# Patient Record
Sex: FEMALE | Race: ASIAN | NOT HISPANIC OR LATINO | URBAN - METROPOLITAN AREA
[De-identification: names, ages, dates, MRNs, and addresses within clinical notes are randomized per-mention and may not be internally consistent; named-entity substitution may affect disease eponyms.]

---

## 2020-02-27 ENCOUNTER — OUTPATIENT (OUTPATIENT)
Dept: OUTPATIENT SERVICES | Facility: HOSPITAL | Age: 55
LOS: 1 days | End: 2020-02-27
Payer: COMMERCIAL

## 2020-02-27 DIAGNOSIS — N84.0 POLYP OF CORPUS UTERI: ICD-10-CM

## 2020-02-28 VITALS
SYSTOLIC BLOOD PRESSURE: 136 MMHG | DIASTOLIC BLOOD PRESSURE: 82 MMHG | HEART RATE: 69 BPM | TEMPERATURE: 98 F | RESPIRATION RATE: 16 BRPM | HEIGHT: 60 IN | OXYGEN SATURATION: 98 % | WEIGHT: 141.98 LBS

## 2020-02-28 DIAGNOSIS — N84.0 POLYP OF CORPUS UTERI: ICD-10-CM

## 2020-02-28 DIAGNOSIS — E11.9 TYPE 2 DIABETES MELLITUS WITHOUT COMPLICATIONS: ICD-10-CM

## 2020-02-28 DIAGNOSIS — I10 ESSENTIAL (PRIMARY) HYPERTENSION: ICD-10-CM

## 2020-02-28 DIAGNOSIS — Z98.890 OTHER SPECIFIED POSTPROCEDURAL STATES: Chronic | ICD-10-CM

## 2020-02-28 LAB
ANION GAP SERPL CALC-SCNC: 13 MMO/L — SIGNIFICANT CHANGE UP (ref 7–14)
BUN SERPL-MCNC: 15 MG/DL — SIGNIFICANT CHANGE UP (ref 7–23)
CALCIUM SERPL-MCNC: 9.5 MG/DL — SIGNIFICANT CHANGE UP (ref 8.4–10.5)
CHLORIDE SERPL-SCNC: 98 MMOL/L — SIGNIFICANT CHANGE UP (ref 98–107)
CO2 SERPL-SCNC: 27 MMOL/L — SIGNIFICANT CHANGE UP (ref 22–31)
CREAT SERPL-MCNC: 0.78 MG/DL — SIGNIFICANT CHANGE UP (ref 0.5–1.3)
GLUCOSE SERPL-MCNC: 128 MG/DL — HIGH (ref 70–99)
HBA1C BLD-MCNC: 7.2 % — HIGH (ref 4–5.6)
HCT VFR BLD CALC: 45.4 % — HIGH (ref 34.5–45)
HGB BLD-MCNC: 14.9 G/DL — SIGNIFICANT CHANGE UP (ref 11.5–15.5)
MCHC RBC-ENTMCNC: 29.2 PG — SIGNIFICANT CHANGE UP (ref 27–34)
MCHC RBC-ENTMCNC: 32.8 % — SIGNIFICANT CHANGE UP (ref 32–36)
MCV RBC AUTO: 88.8 FL — SIGNIFICANT CHANGE UP (ref 80–100)
NRBC # FLD: 0 K/UL — SIGNIFICANT CHANGE UP (ref 0–0)
PLATELET # BLD AUTO: 247 K/UL — SIGNIFICANT CHANGE UP (ref 150–400)
PMV BLD: 10.3 FL — SIGNIFICANT CHANGE UP (ref 7–13)
POTASSIUM SERPL-MCNC: 3.5 MMOL/L — SIGNIFICANT CHANGE UP (ref 3.5–5.3)
POTASSIUM SERPL-SCNC: 3.5 MMOL/L — SIGNIFICANT CHANGE UP (ref 3.5–5.3)
RBC # BLD: 5.11 M/UL — SIGNIFICANT CHANGE UP (ref 3.8–5.2)
RBC # FLD: 12.3 % — SIGNIFICANT CHANGE UP (ref 10.3–14.5)
SODIUM SERPL-SCNC: 138 MMOL/L — SIGNIFICANT CHANGE UP (ref 135–145)
WBC # BLD: 5.69 K/UL — SIGNIFICANT CHANGE UP (ref 3.8–10.5)
WBC # FLD AUTO: 5.69 K/UL — SIGNIFICANT CHANGE UP (ref 3.8–10.5)

## 2020-02-28 PROCEDURE — 93010 ELECTROCARDIOGRAM REPORT: CPT

## 2020-02-28 NOTE — H&P PST ADULT - NEGATIVE GENERAL GENITOURINARY SYMPTOMS
no dysuria/no flank pain L/no bladder infections/no flank pain R/no urinary hesitancy/normal urinary frequency/no hematuria

## 2020-02-28 NOTE — H&P PST ADULT - NSICDXPROBLEM_GEN_ALL_CORE_FT
PROBLEM DIAGNOSES  Problem: Polyp of corpus uteri  Assessment and Plan: Scheduled for dilation curettage hysteroscopy, polypctomy with larissa on 3/11/2020. labs done and results pending.  Verbal and written preop instruction given and explained. Patient verbalized understanding. Famotidine provided with instruction. Patient verbalized understanding.   As per pt, medical evaluation requested by surgeon and will see PCP this PM for medical evaluation.     Problem: Hypertension  Assessment and Plan: Instructed to continue taking losartan as prescribed. EKG done at [facility]. intermediate risk for sleep apnea identified by STOP BANG survey. OR booking notified via fax.     Problem: Diabetes mellitus  Assessment and Plan: HbgA1C sent, pending result. Blood glucose - Accuchek ordered stat on admit. OR booking was notified pt's DM status via fax.   Instructed to hold metformin PM on 3/10/2020 & AM of surgery. Verbalized understanding.

## 2020-02-28 NOTE — H&P PST ADULT - RS GEN PE MLT RESP DETAILS PC
no rhonchi/clear to auscultation bilaterally/good air movement/respirations non-labored/no rales/no wheezes

## 2020-02-28 NOTE — H&P PST ADULT - HISTORY OF PRESENT ILLNESS
54 yo female with hx of HTN, T2DM, HLD presents to have PST evaluation with preop dx of polyp of corpus uteri. Patient reports, had initial gyn evaluation in 11/2019, had TVUS done,  which revealed, "thickening of lining & polyp in uterus", surgical intervention was recommended, now scheduled for dilation curettage hysteroscopy, polypectomy with larissa. 54 yo female with hx of HTN, T2DM, HLD presents to have PST evaluation with preop dx of polyp of corpus uteri. Patient reports, had annual gyn evaluation in 11/2019, had TVUS done,  which revealed, "thickening of lining & polyp in uterus", surgical intervention was recommended, now scheduled for dilation curettage hysteroscopy, polypectomy with larissa.

## 2020-02-28 NOTE — H&P PST ADULT - NEGATIVE ENMT SYMPTOMS
no sinus symptoms/no nasal obstruction/no post-nasal discharge/no ear pain/no tinnitus/no hearing difficulty/no vertigo/no nasal discharge/no dysphagia/no nasal congestion No

## 2020-02-28 NOTE — H&P PST ADULT - NEGATIVE NEUROLOGICAL SYMPTOMS
no syncope/no paresthesias/no weakness/no tremors/no headache/no focal seizures/no difficulty walking/no vertigo

## 2020-03-10 NOTE — ASU PATIENT PROFILE, ADULT - MENTAL HEALTH CONDITIONS/SYMPTOMS, PROFILE
Date of Service: 03/29/2018    Sabrina is a 65-year-old white female entering today for follow up on her hypertension, her broken neck and hyponatremia.  Bonnie is now complaining of a little redness in her right leg, which I think is a superficial phlebitis.    X-rays were taken of her right leg and her hip and they were normal.  I suggested to her that this may be a little localized area of superficial phlebitis, but if it does not resolve with conservative therapy she may need a bone scan.    She has had no fainting or falling, she states, although her  tells me that she has fallen and that she is drinking, although the patient will not admit it to me and I really do not want to hurt her feelings by confronting her with it, but apparently she is drinking a fair amount.    Her other medications were reviewed with her and are as listed.    PHYSICAL EXAMINATION:  VITAL SIGNS:  Blood pressure is today 160/100 in both arms and then standing dropped to 140/100.  HEART:  Tones are regular.  CHEST:  Clear with no wheezes, rhonchi or rales.  ABDOMEN:  Nontender with a little bit of liver enlargement.  EXTREMITIES:  No peripheral edema.      Suggested she reduce her Sodium Chloride to once a day and go back on her Catapres 0.1 and workup to 2-3 daily if necessary.  Her sodium and potassium were good today with a sodium of 133 and a normal potassium and creatinine, and for that reason, she will continue on the same medications and see me for her physical in May.  Let me know if the leg does not get better.      Dictated By: Zoran Casiano MD  Signing Provider: MD NATY Landa/eugenia ([id number])  DD: 03/29/2018 11:13:14 TD: 03/29/2018 20:53:25    Copy Sent To:    none

## 2020-03-19 PROBLEM — I10 ESSENTIAL (PRIMARY) HYPERTENSION: Chronic | Status: ACTIVE | Noted: 2020-02-28

## 2020-03-19 PROBLEM — E11.9 TYPE 2 DIABETES MELLITUS WITHOUT COMPLICATIONS: Chronic | Status: ACTIVE | Noted: 2020-02-28

## 2020-03-19 PROBLEM — E78.5 HYPERLIPIDEMIA, UNSPECIFIED: Chronic | Status: ACTIVE | Noted: 2020-02-28

## 2020-07-25 DIAGNOSIS — Z01.818 ENCOUNTER FOR OTHER PREPROCEDURAL EXAMINATION: ICD-10-CM

## 2020-07-28 ENCOUNTER — APPOINTMENT (OUTPATIENT)
Dept: DISASTER EMERGENCY | Facility: CLINIC | Age: 55
End: 2020-07-28

## 2020-07-29 ENCOUNTER — TRANSCRIPTION ENCOUNTER (OUTPATIENT)
Age: 55
End: 2020-07-29

## 2020-07-29 LAB — SARS-COV-2 N GENE NPH QL NAA+PROBE: NOT DETECTED

## 2020-07-29 RX ORDER — ATORVASTATIN CALCIUM 80 MG/1
1 TABLET, FILM COATED ORAL
Qty: 0 | Refills: 0 | DISCHARGE

## 2020-07-29 RX ORDER — LOSARTAN POTASSIUM 100 MG/1
1 TABLET, FILM COATED ORAL
Qty: 0 | Refills: 0 | DISCHARGE

## 2020-07-29 RX ORDER — SODIUM CHLORIDE 9 MG/ML
3 INJECTION INTRAMUSCULAR; INTRAVENOUS; SUBCUTANEOUS EVERY 8 HOURS
Refills: 0 | Status: DISCONTINUED | OUTPATIENT
Start: 2020-07-30 | End: 2020-08-14

## 2020-07-29 RX ORDER — LIDOCAINE HCL 20 MG/ML
0.2 VIAL (ML) INJECTION ONCE
Refills: 0 | Status: DISCONTINUED | OUTPATIENT
Start: 2020-07-30 | End: 2020-08-14

## 2020-07-29 RX ORDER — METFORMIN HYDROCHLORIDE 850 MG/1
2 TABLET ORAL
Qty: 0 | Refills: 0 | DISCHARGE

## 2020-07-30 ENCOUNTER — OUTPATIENT (OUTPATIENT)
Dept: OUTPATIENT SERVICES | Facility: HOSPITAL | Age: 55
LOS: 1 days | End: 2020-07-30
Payer: COMMERCIAL

## 2020-07-30 ENCOUNTER — RESULT REVIEW (OUTPATIENT)
Age: 55
End: 2020-07-30

## 2020-07-30 ENCOUNTER — EMERGENCY (EMERGENCY)
Facility: HOSPITAL | Age: 55
LOS: 1 days | End: 2020-07-30
Attending: EMERGENCY MEDICINE
Payer: COMMERCIAL

## 2020-07-30 VITALS
RESPIRATION RATE: 16 BRPM | OXYGEN SATURATION: 99 % | DIASTOLIC BLOOD PRESSURE: 72 MMHG | WEIGHT: 138.01 LBS | TEMPERATURE: 99 F | HEIGHT: 59.5 IN | HEART RATE: 76 BPM | SYSTOLIC BLOOD PRESSURE: 106 MMHG

## 2020-07-30 VITALS
TEMPERATURE: 98 F | OXYGEN SATURATION: 100 % | HEART RATE: 96 BPM | DIASTOLIC BLOOD PRESSURE: 82 MMHG | SYSTOLIC BLOOD PRESSURE: 146 MMHG | RESPIRATION RATE: 13 BRPM

## 2020-07-30 VITALS
TEMPERATURE: 98 F | RESPIRATION RATE: 20 BRPM | OXYGEN SATURATION: 98 % | DIASTOLIC BLOOD PRESSURE: 78 MMHG | HEIGHT: 59.5 IN | SYSTOLIC BLOOD PRESSURE: 158 MMHG | HEART RATE: 110 BPM

## 2020-07-30 DIAGNOSIS — Z98.890 OTHER SPECIFIED POSTPROCEDURAL STATES: Chronic | ICD-10-CM

## 2020-07-30 DIAGNOSIS — N84.0 POLYP OF CORPUS UTERI: ICD-10-CM

## 2020-07-30 LAB
A1C WITH ESTIMATED AVERAGE GLUCOSE RESULT: 7.2 % — HIGH (ref 4–5.6)
ALBUMIN SERPL ELPH-MCNC: 4.3 G/DL — SIGNIFICANT CHANGE UP (ref 3.3–5)
ALP SERPL-CCNC: 75 U/L — SIGNIFICANT CHANGE UP (ref 40–120)
ALT FLD-CCNC: 22 U/L — SIGNIFICANT CHANGE UP (ref 10–45)
ANION GAP SERPL CALC-SCNC: 15 MMOL/L — SIGNIFICANT CHANGE UP (ref 5–17)
ANION GAP SERPL CALC-SCNC: 20 MMOL/L — HIGH (ref 5–17)
APPEARANCE UR: CLEAR — SIGNIFICANT CHANGE UP
APTT BLD: 35.3 SEC — SIGNIFICANT CHANGE UP (ref 27.5–35.5)
AST SERPL-CCNC: 26 U/L — SIGNIFICANT CHANGE UP (ref 10–40)
BACTERIA # UR AUTO: NEGATIVE — SIGNIFICANT CHANGE UP
BASE EXCESS BLDV CALC-SCNC: 2 MMOL/L — SIGNIFICANT CHANGE UP (ref -2–2)
BASOPHILS # BLD AUTO: 0 K/UL — SIGNIFICANT CHANGE UP (ref 0–0.2)
BASOPHILS NFR BLD AUTO: 0 % — SIGNIFICANT CHANGE UP (ref 0–2)
BILIRUB SERPL-MCNC: 0.6 MG/DL — SIGNIFICANT CHANGE UP (ref 0.2–1.2)
BILIRUB UR-MCNC: NEGATIVE — SIGNIFICANT CHANGE UP
BUN SERPL-MCNC: 15 MG/DL — SIGNIFICANT CHANGE UP (ref 7–23)
BUN SERPL-MCNC: 16 MG/DL — SIGNIFICANT CHANGE UP (ref 7–23)
CA-I SERPL-SCNC: 1.1 MMOL/L — LOW (ref 1.12–1.3)
CALCIUM SERPL-MCNC: 9.2 MG/DL — SIGNIFICANT CHANGE UP (ref 8.4–10.5)
CALCIUM SERPL-MCNC: 9.8 MG/DL — SIGNIFICANT CHANGE UP (ref 8.4–10.5)
CHLORIDE BLDV-SCNC: 103 MMOL/L — SIGNIFICANT CHANGE UP (ref 96–108)
CHLORIDE SERPL-SCNC: 101 MMOL/L — SIGNIFICANT CHANGE UP (ref 96–108)
CHLORIDE SERPL-SCNC: 97 MMOL/L — SIGNIFICANT CHANGE UP (ref 96–108)
CO2 BLDV-SCNC: 29 MMOL/L — SIGNIFICANT CHANGE UP (ref 22–30)
CO2 SERPL-SCNC: 22 MMOL/L — SIGNIFICANT CHANGE UP (ref 22–31)
CO2 SERPL-SCNC: 25 MMOL/L — SIGNIFICANT CHANGE UP (ref 22–31)
COLOR SPEC: ABNORMAL
CREAT SERPL-MCNC: 0.76 MG/DL — SIGNIFICANT CHANGE UP (ref 0.5–1.3)
CREAT SERPL-MCNC: 0.77 MG/DL — SIGNIFICANT CHANGE UP (ref 0.5–1.3)
DIFF PNL FLD: ABNORMAL
EOSINOPHIL # BLD AUTO: 0.09 K/UL — SIGNIFICANT CHANGE UP (ref 0–0.5)
EOSINOPHIL NFR BLD AUTO: 0.9 % — SIGNIFICANT CHANGE UP (ref 0–6)
EPI CELLS # UR: 1 /HPF — SIGNIFICANT CHANGE UP
ESTIMATED AVERAGE GLUCOSE: 160 MG/DL — HIGH (ref 68–114)
GAS PNL BLDV: 138 MMOL/L — SIGNIFICANT CHANGE UP (ref 135–145)
GAS PNL BLDV: SIGNIFICANT CHANGE UP
GLUCOSE BLDV-MCNC: 157 MG/DL — HIGH (ref 70–99)
GLUCOSE SERPL-MCNC: 133 MG/DL — HIGH (ref 70–99)
GLUCOSE SERPL-MCNC: 155 MG/DL — HIGH (ref 70–99)
GLUCOSE UR QL: NEGATIVE — SIGNIFICANT CHANGE UP
HCO3 BLDV-SCNC: 28 MMOL/L — SIGNIFICANT CHANGE UP (ref 21–29)
HCT VFR BLD CALC: 44.4 % — SIGNIFICANT CHANGE UP (ref 34.5–45)
HCT VFR BLD CALC: 45.4 % — HIGH (ref 34.5–45)
HCT VFR BLDA CALC: 49 % — SIGNIFICANT CHANGE UP (ref 39–50)
HGB BLD CALC-MCNC: 16 G/DL — HIGH (ref 11.5–15.5)
HGB BLD-MCNC: 15.1 G/DL — SIGNIFICANT CHANGE UP (ref 11.5–15.5)
HGB BLD-MCNC: 15.3 G/DL — SIGNIFICANT CHANGE UP (ref 11.5–15.5)
HYALINE CASTS # UR AUTO: 3 /LPF — HIGH (ref 0–2)
INR BLD: 1.02 RATIO — SIGNIFICANT CHANGE UP (ref 0.88–1.16)
KETONES UR-MCNC: NEGATIVE — SIGNIFICANT CHANGE UP
LACTATE BLDV-MCNC: 2.1 MMOL/L — HIGH (ref 0.7–2)
LEUKOCYTE ESTERASE UR-ACNC: NEGATIVE — SIGNIFICANT CHANGE UP
LYMPHOCYTES # BLD AUTO: 0.27 K/UL — LOW (ref 1–3.3)
LYMPHOCYTES # BLD AUTO: 2.6 % — LOW (ref 13–44)
MAGNESIUM SERPL-MCNC: 2.4 MG/DL — SIGNIFICANT CHANGE UP (ref 1.6–2.6)
MANUAL SMEAR VERIFICATION: SIGNIFICANT CHANGE UP
MCHC RBC-ENTMCNC: 29.5 PG — SIGNIFICANT CHANGE UP (ref 27–34)
MCHC RBC-ENTMCNC: 29.8 PG — SIGNIFICANT CHANGE UP (ref 27–34)
MCHC RBC-ENTMCNC: 33.7 GM/DL — SIGNIFICANT CHANGE UP (ref 32–36)
MCHC RBC-ENTMCNC: 34 GM/DL — SIGNIFICANT CHANGE UP (ref 32–36)
MCV RBC AUTO: 87.5 FL — SIGNIFICANT CHANGE UP (ref 80–100)
MCV RBC AUTO: 87.7 FL — SIGNIFICANT CHANGE UP (ref 80–100)
MONOCYTES # BLD AUTO: 0.09 K/UL — SIGNIFICANT CHANGE UP (ref 0–0.9)
MONOCYTES NFR BLD AUTO: 0.9 % — LOW (ref 2–14)
NEUTROPHILS # BLD AUTO: 9.82 K/UL — HIGH (ref 1.8–7.4)
NEUTROPHILS NFR BLD AUTO: 93.9 % — HIGH (ref 43–77)
NEUTS BAND # BLD: 1.7 % — SIGNIFICANT CHANGE UP (ref 0–8)
NITRITE UR-MCNC: NEGATIVE — SIGNIFICANT CHANGE UP
NRBC # BLD: 0 /100 WBCS — SIGNIFICANT CHANGE UP (ref 0–0)
PCO2 BLDV: 48 MMHG — SIGNIFICANT CHANGE UP (ref 35–50)
PH BLDV: 7.38 — SIGNIFICANT CHANGE UP (ref 7.35–7.45)
PH UR: 6 — SIGNIFICANT CHANGE UP (ref 5–8)
PHOSPHATE SERPL-MCNC: 2.6 MG/DL — SIGNIFICANT CHANGE UP (ref 2.5–4.5)
PLAT MORPH BLD: NORMAL — SIGNIFICANT CHANGE UP
PLATELET # BLD AUTO: 222 K/UL — SIGNIFICANT CHANGE UP (ref 150–400)
PLATELET # BLD AUTO: 256 K/UL — SIGNIFICANT CHANGE UP (ref 150–400)
PO2 BLDV: 38 MMHG — SIGNIFICANT CHANGE UP (ref 25–45)
POTASSIUM BLDV-SCNC: 3.3 MMOL/L — LOW (ref 3.5–5.3)
POTASSIUM SERPL-MCNC: 3.1 MMOL/L — LOW (ref 3.5–5.3)
POTASSIUM SERPL-MCNC: 4.1 MMOL/L — SIGNIFICANT CHANGE UP (ref 3.5–5.3)
POTASSIUM SERPL-SCNC: 3.1 MMOL/L — LOW (ref 3.5–5.3)
POTASSIUM SERPL-SCNC: 4.1 MMOL/L — SIGNIFICANT CHANGE UP (ref 3.5–5.3)
PROT SERPL-MCNC: 8 G/DL — SIGNIFICANT CHANGE UP (ref 6–8.3)
PROT UR-MCNC: SIGNIFICANT CHANGE UP
PROTHROM AB SERPL-ACNC: 12.1 SEC — SIGNIFICANT CHANGE UP (ref 10.6–13.6)
RBC # BLD: 5.06 M/UL — SIGNIFICANT CHANGE UP (ref 3.8–5.2)
RBC # BLD: 5.19 M/UL — SIGNIFICANT CHANGE UP (ref 3.8–5.2)
RBC # FLD: 12.7 % — SIGNIFICANT CHANGE UP (ref 10.3–14.5)
RBC # FLD: 12.7 % — SIGNIFICANT CHANGE UP (ref 10.3–14.5)
RBC BLD AUTO: SIGNIFICANT CHANGE UP
RBC CASTS # UR COMP ASSIST: 132 /HPF — HIGH (ref 0–4)
SAO2 % BLDV: 69 % — SIGNIFICANT CHANGE UP (ref 67–88)
SARS-COV-2 RNA SPEC QL NAA+PROBE: SIGNIFICANT CHANGE UP
SODIUM SERPL-SCNC: 139 MMOL/L — SIGNIFICANT CHANGE UP (ref 135–145)
SODIUM SERPL-SCNC: 141 MMOL/L — SIGNIFICANT CHANGE UP (ref 135–145)
SP GR SPEC: 1.02 — SIGNIFICANT CHANGE UP (ref 1.01–1.02)
TROPONIN T, HIGH SENSITIVITY RESULT: <6 NG/L — SIGNIFICANT CHANGE UP (ref 0–51)
UROBILINOGEN FLD QL: NEGATIVE — SIGNIFICANT CHANGE UP
WBC # BLD: 10.27 K/UL — SIGNIFICANT CHANGE UP (ref 3.8–10.5)
WBC # BLD: 6.02 K/UL — SIGNIFICANT CHANGE UP (ref 3.8–10.5)
WBC # FLD AUTO: 10.27 K/UL — SIGNIFICANT CHANGE UP (ref 3.8–10.5)
WBC # FLD AUTO: 6.02 K/UL — SIGNIFICANT CHANGE UP (ref 3.8–10.5)
WBC UR QL: 3 /HPF — SIGNIFICANT CHANGE UP (ref 0–5)

## 2020-07-30 PROCEDURE — 80048 BASIC METABOLIC PNL TOTAL CA: CPT

## 2020-07-30 PROCEDURE — 83036 HEMOGLOBIN GLYCOSYLATED A1C: CPT

## 2020-07-30 PROCEDURE — 71045 X-RAY EXAM CHEST 1 VIEW: CPT | Mod: 26

## 2020-07-30 PROCEDURE — 58558 HYSTEROSCOPY BIOPSY: CPT

## 2020-07-30 PROCEDURE — 85027 COMPLETE CBC AUTOMATED: CPT

## 2020-07-30 PROCEDURE — 88305 TISSUE EXAM BY PATHOLOGIST: CPT | Mod: 26

## 2020-07-30 PROCEDURE — 88305 TISSUE EXAM BY PATHOLOGIST: CPT

## 2020-07-30 PROCEDURE — 82962 GLUCOSE BLOOD TEST: CPT

## 2020-07-30 PROCEDURE — 99220: CPT

## 2020-07-30 PROCEDURE — 93005 ELECTROCARDIOGRAM TRACING: CPT

## 2020-07-30 PROCEDURE — 99245 OFF/OP CONSLTJ NEW/EST HI 55: CPT

## 2020-07-30 PROCEDURE — 93010 ELECTROCARDIOGRAM REPORT: CPT

## 2020-07-30 RX ORDER — POTASSIUM CHLORIDE 20 MEQ
40 PACKET (EA) ORAL ONCE
Refills: 0 | Status: COMPLETED | OUTPATIENT
Start: 2020-07-30 | End: 2020-07-30

## 2020-07-30 RX ORDER — CELECOXIB 200 MG/1
200 CAPSULE ORAL ONCE
Refills: 0 | Status: DISCONTINUED | OUTPATIENT
Start: 2020-07-30 | End: 2020-08-14

## 2020-07-30 RX ORDER — SODIUM CHLORIDE 9 MG/ML
1000 INJECTION INTRAMUSCULAR; INTRAVENOUS; SUBCUTANEOUS
Refills: 0 | Status: DISCONTINUED | OUTPATIENT
Start: 2020-07-30 | End: 2020-08-03

## 2020-07-30 RX ORDER — ONDANSETRON 8 MG/1
4 TABLET, FILM COATED ORAL ONCE
Refills: 0 | Status: DISCONTINUED | OUTPATIENT
Start: 2020-07-30 | End: 2020-08-14

## 2020-07-30 RX ORDER — HYDROCHLOROTHIAZIDE 25 MG
12.5 TABLET ORAL DAILY
Refills: 0 | Status: DISCONTINUED | OUTPATIENT
Start: 2020-07-30 | End: 2020-08-03

## 2020-07-30 RX ORDER — LOSARTAN POTASSIUM 100 MG/1
50 TABLET, FILM COATED ORAL DAILY
Refills: 0 | Status: DISCONTINUED | OUTPATIENT
Start: 2020-07-30 | End: 2020-08-03

## 2020-07-30 RX ORDER — ATORVASTATIN CALCIUM 80 MG/1
20 TABLET, FILM COATED ORAL AT BEDTIME
Refills: 0 | Status: DISCONTINUED | OUTPATIENT
Start: 2020-07-30 | End: 2020-08-03

## 2020-07-30 RX ORDER — METFORMIN HYDROCHLORIDE 850 MG/1
500 TABLET ORAL
Refills: 0 | Status: DISCONTINUED | OUTPATIENT
Start: 2020-07-30 | End: 2020-08-03

## 2020-07-30 RX ORDER — SODIUM CHLORIDE 9 MG/ML
1000 INJECTION, SOLUTION INTRAVENOUS
Refills: 0 | Status: DISCONTINUED | OUTPATIENT
Start: 2020-07-30 | End: 2020-08-14

## 2020-07-30 RX ORDER — POTASSIUM CHLORIDE 20 MEQ
0 PACKET (EA) ORAL
Qty: 0 | Refills: 0 | DISCHARGE

## 2020-07-30 RX ORDER — OXYCODONE HYDROCHLORIDE 5 MG/1
5 TABLET ORAL ONCE
Refills: 0 | Status: DISCONTINUED | OUTPATIENT
Start: 2020-07-30 | End: 2020-07-30

## 2020-07-30 RX ADMIN — SODIUM CHLORIDE 75 MILLILITER(S): 9 INJECTION INTRAMUSCULAR; INTRAVENOUS; SUBCUTANEOUS at 19:00

## 2020-07-30 RX ADMIN — METFORMIN HYDROCHLORIDE 500 MILLIGRAM(S): 850 TABLET ORAL at 20:41

## 2020-07-30 RX ADMIN — ATORVASTATIN CALCIUM 20 MILLIGRAM(S): 80 TABLET, FILM COATED ORAL at 22:28

## 2020-07-30 RX ADMIN — Medication 40 MILLIEQUIVALENT(S): at 19:39

## 2020-07-30 RX ADMIN — LOSARTAN POTASSIUM 50 MILLIGRAM(S): 100 TABLET, FILM COATED ORAL at 22:28

## 2020-07-30 RX ADMIN — Medication 12.5 MILLIGRAM(S): at 22:28

## 2020-07-30 NOTE — ED PROVIDER NOTE - ATTENDING CONTRIBUTION TO CARE
RGUJRAL 54 yo female hx listed BIB EMS from ambulatory surgery s/p hysteroscopy and D and C. patient was intubated with LMA and states upon waking up she had L sided chest pressure. Per GYN, pt had episode of bradycardia and bigeminy. Patient states episode lasted few minutes and now improving.   Procedure was about 1 hour.   On exam, Patient is awake, alert and oriented x 3.  Patient is well appearing and in no acute distress.  NCAT, PERRL, EOMI.  Neck is supple, No LAD.  Lungs are CTA B/L,+S1S2 no murmurs,  Abdomen:Soft nd/nt+bs no rebound or guarding.  Extremity no edema or calf tender.  Skin with no rash.  Neuro CN3-12 intact. Strength 5/5 in upper and lower extremities. Nml Sensation.  EKG reviewed. Check Labs, Tele monitoring and cards consult per GYN. RGUJRAL 56 yo female hx listed BIB EMS from ambulatory surgery s/p hysteroscopy and D&C for polyp evaluation. patient was intubated with LMA and states upon waking up she had L sided chest pressure. Per GYN, pt had episode of bradycardia and bigeminy. Patient states episode lasted few minutes and now improving. Denies any complaints at this time.   Procedure was about 1 hour.   On exam, Patient is awake, alert and oriented x 3.  Patient is well appearing and in no acute distress.  NCAT, PERRL, EOMI.  Neck is supple, No LAD.  Lungs are CTA B/L,+S1S2 no murmurs,  Abdomen:Soft nd/nt+bs no rebound or guarding.  Extremity no edema or calf tender.  Skin with no rash.  Neuro CN3-12 intact. Strength 5/5 in upper and lower extremities. Nml Sensation.  EKG reviewed. Check Labs, Tele monitoring and cards consult per GYN.

## 2020-07-30 NOTE — ED ADULT NURSE NOTE - OBJECTIVE STATEMENT
54 y/o F A&Ox3 PMH HLD, DM, HTN & PSH D&C, hysteroscopy presents to the ED via EMS c/o chest pain, pressure & SOB. Pt was at [facility] ambulatory surgery getting D&C, when pt awoke from anesthesia she felt left sided CP and SOB. According to EMS, surgeon said pt had frequent PVCs while under anesthesia. 54 y/o F A&Ox3 PMH HLD, DM, HTN & PSH D&C, hysteroscopy presents to the ED via EMS c/o chest pain, pressure & SOB. Pt was at [facility] ambulatory surgery getting D&C, when pt awoke from anesthesia she felt left sided CP and SOB. According to EMS, surgeon said pt had frequent PVCs while under anesthesia. Pt reports that PCP recently put her on Potassium supplements everyday for low Potassium level. Pt has been taking medication daily for several weeks. Pt denies cardiac hx or any similar episodes. Pt medically cleared for the procedure, has undergone anesthesia previously with no complications. Denies N/V, headaches, dizziness, fevers, chills, recent falls or injuries. Upon arrival to the ED pt denies SOB and lessening left sided chest pressure, tachycardic to the 110s, satting 100% on RA. EKG completed at bedside, pt placed on CM. Call bell within reach, comfort and safety provided.

## 2020-07-30 NOTE — ASU DISCHARGE PLAN (ADULT/PEDIATRIC) - CARE PROVIDER_API CALL
Erendira De)  Obstetrics and Gynecology  [address], [address]  [place] [address]  Phone: [phone]  Fax: [phone]  Established Patient  Follow Up Time: 2 weeks

## 2020-07-30 NOTE — ED CDU PROVIDER DISPOSITION NOTE - PATIENT PORTAL LINK FT
You can access the FollowMyHealth Patient Portal offered by [facility] by registering at the following website: [email or web address]. By joining [facility]’s FollowMyHealth portal, you will also be able to view your health information using other applications (apps) compatible with our system.

## 2020-07-30 NOTE — ED CDU PROVIDER DISPOSITION NOTE - NSFOLLOWUPINSTRUCTIONS_ED_ALL_ED_FT
- stay hydrated.   -take all home medications as prescribed  -Follow up with cardiology this week, call ________ to make an appointment.   - return if symptoms worsen, weakness, chest pain, shortness of breath, difficulty breathing and all other concerns. - stay hydrated.   - take all home medications as previously prescribed  - Follow up with cardiology Dr. Blevins, call [phone] to make an appointment.   - return to ER if symptoms worsen, or if you develop any weakness, chest pain, shortness of breath, difficulty breathing, or any other concerns.

## 2020-07-30 NOTE — ED PROVIDER NOTE - NS ED ATTENDING STATEMENT MOD
I have personally performed a face to face diagnostic evaluation on this patient. I have reviewed the ACP note and agree with the history, exam and plan of care, except as noted. I have personally seen and examined this patient. I have fully participated in the care of this patient. I have reviewed all pertinent clinical information, including history, physical exam, plan and the Medical/PA/NP Student's note and agree except as noted.

## 2020-07-30 NOTE — ED CDU PROVIDER DISPOSITION NOTE - CLINICAL COURSE
56yo woman with PMH of HTN, HLD, DM coming in s/p hysteroscopy due to chest pressure. Patient had hysteroscopy today for fundus polyp removal and D&C. During the procedure patient had bradycardia and bigeminy. After waking up, patient started complaining of pressure under the left breast. Did not radiate anywhere. Was cleared for hysteroscopy by PCP. States she takes metformin, losartan, HCTZ, and atorvastatin; has recently started taking potassium supplements. Pre-anesthesia lab work shows electrolytes within normal limits. Currently patient is not in pain or distress.   Denies headaches, LOC, recent weight changes, SOB, fever.   In Ed pt with low potassium at 3.1 which was repleated, labs otherwise inactionable, EKG sinus tach, CP free, seen by cards and recommended CDU for tele monitoring and frequent evaluation    in CDU______, potassium 3.1-->______, cards recommended_______. 54yo woman with PMH of HTN, HLD, DM coming in s/p hysteroscopy due to chest pressure. Patient had hysteroscopy today for fundus polyp removal and D&C. During the procedure patient had bradycardia and bigeminy. After waking up, patient started complaining of pressure under the left breast. Did not radiate anywhere. Was cleared for hysteroscopy by PCP. States she takes metformin, losartan, HCTZ, and atorvastatin; has recently started taking potassium supplements. Pre-anesthesia lab work shows electrolytes within normal limits. Currently patient is not in pain or distress.   Denies headaches, LOC, recent weight changes, SOB, fever.   In Ed pt with low potassium at 3.1 which was repleated, labs otherwise inactionable, EKG sinus tach, CP free, seen by cards and recommended CDU for tele monitoring and frequent evaluation   Labs improved. symptoms resolved. pt was discharged home with outpt cardilogy followup.

## 2020-07-30 NOTE — CONSULT NOTE ADULT - SUBJECTIVE AND OBJECTIVE BOX
Patient seen and evaluated at bedside    Chief Complaint: chest pain/PVCs/bradycardia    HPI:  56 yo F with HTN, DM, HLD brought to the ED from ambulatory surgery clinic after having chest pain, PVCs, and bradycardia immediately post procedure.  Denies any active chest pain at this time- states it resolved by the time she arrived to the ED. Denies any hx of CAD or cardiac procedures.    PMHx:   Hyperlipidemia  DM (diabetes mellitus)  Hypertension      PSHx:   Status post hysteroscopy  S/P dilation and curettage      Allergies:  No Known Allergies      Home Meds:  Losartan  Metformin  Atorvastatin    Current Medications:   sodium chloride 0.9%. 1000 milliLiter(s) IV Continuous <Continuous>      FAMILY HISTORY:  FH: heart disease (Father, Mother): mother, father            Physical Exam:  T(F): 98.4 (07-30), Max: 98.6 (07-30)  HR: 103 (07-30) (76 - 110)  BP: 143/74 (07-30) (106/72 - 158/78)  RR: 15 (07-30)  SpO2: 97% (07-30)  GENERAL: No acute distress, well-developed  CHEST/LUNG: Clear to auscultation bilaterally; No wheeze, equal breath sounds bilaterally   HEART: Regular rate and rhythm; No murmurs, rubs, or gallops  Ext: no edema bilaterally    LINES:    Cardiovascular Diagnostic Testing:    ECG: Personally reviewed: sinus tachycardia (HR: 107)). No ST/T wave changes. Occasional PVCs noted on tele                                15.3   10.27 )-----------( 222      ( 30 Jul 2020 18:21 )             45.4     07-30    141  |  101  |  16  ----------------------------<  133<H>  4.1   |  25  |  0.77    Ca    9.8      30 Jul 2020 12:20      PT/INR - ( 30 Jul 2020 18:21 )   PT: 12.1 sec;   INR: 1.02 ratio         PTT - ( 30 Jul 2020 18:21 )  PTT:35.3 sec Patient seen and evaluated at bedside    Chief Complaint: chest pain/PVCs/bradycardia    HPI:  56 yo F with HTN, DM, HLD brought to the ED from ambulatory surgery clinic after having chest pain, PVCs, and bradycardia immediately post procedure.  Denies any active chest pain at this time- states it resolved by the time she arrived to the ED. Denies any hx of CAD or cardiac procedures.    PMHx:   Hyperlipidemia  DM (diabetes mellitus)  Hypertension      PSHx:   Status post hysteroscopy  S/P dilation and curettage      Allergies:  No Known Allergies      Home Meds:  Losartan  Metformin  Atorvastatin    Current Medications:   sodium chloride 0.9%. 1000 milliLiter(s) IV Continuous <Continuous>      FAMILY HISTORY:  FH: heart disease (Father, Mother): mother, father            Physical Exam:  T(F): 98.4 (07-30), Max: 98.6 (07-30)  HR: 103 (07-30) (76 - 110)  BP: 143/74 (07-30) (106/72 - 158/78)  RR: 15 (07-30)  SpO2: 97% (07-30)  GENERAL: No acute distress, well-developed  CHEST/LUNG: Clear to auscultation bilaterally; No wheeze, equal breath sounds bilaterally   HEART: Regular rate and rhythm; No murmurs, rubs, or gallops  Ext: no edema bilaterally  Abd: Bening soft  Ext: no edema  Skin: No lesions  Psychiatric: normal affect  Neuro: Cranial nerves grosslyintact     LINES:    Cardiovascular Diagnostic Testing:    ECG: Personally reviewed: sinus tachycardia (HR: 107)). No ST/T wave changes. Occasional PVCs noted on tele                                15.3   10.27 )-----------( 222      ( 30 Jul 2020 18:21 )             45.4     07-30    141  |  101  |  16  ----------------------------<  133<H>  4.1   |  25  |  0.77    Ca    9.8      30 Jul 2020 12:20      PT/INR - ( 30 Jul 2020 18:21 )   PT: 12.1 sec;   INR: 1.02 ratio         PTT - ( 30 Jul 2020 18:21 )  PTT:35.3 sec

## 2020-07-30 NOTE — ASU DISCHARGE PLAN (ADULT/PEDIATRIC) - ACTIVITY LEVEL
No tub baths/Nothing per vagina/No tampons/No douching/No intercourse/No heavy lifting/Weight bearing as tolerated

## 2020-07-30 NOTE — ED PROVIDER NOTE - SEVERE SEPSIS ALERT DETAILS 2
SAIRA Bryant:  I have seen and evaluated this patient and do not believe the patient is septic at this time.  I will continue to evaluate.

## 2020-07-30 NOTE — BRIEF OPERATIVE NOTE - NSICDXBRIEFPREOP_GEN_ALL_CORE_FT
PRE-OP DIAGNOSIS:  Endometrial polyp 30-Jul-2020 16:12:53  Erendira De  Postmenopausal bleeding 30-Jul-2020 16:12:46  Erendira De

## 2020-07-30 NOTE — ED ADULT NURSE REASSESSMENT NOTE - NS ED NURSE REASSESS COMMENT FT1
Received pt from LUZ MARIA Conklin, received pt alert and responsive, oriented x4, denies any respiratory distress, SOB, or difficulty breathing. Pt transferred to CDU for of chest pain. Pt is currently pain free at this time with no complaints of chest pain/ pressure, SOB, diaphoresis, dizziness, lightheadedness, N/V, F/C, heart palpitations. On telemetry pt is NSR 80's.  IV in place, patent and free of signs of infiltration, on tele pt is NSR hr: 80's. V/S stable, pt afebrile, pt denies pain at this time. Pt educated on unit and unit rules, instructed patient to notify RN of any needed assistance, Pt verbalizes understanding, Call bell placed within reach. Safety maintained. Will continue to monitor.

## 2020-07-30 NOTE — ED ADULT NURSE NOTE - NS ED NURSE LEVEL OF CONSCIOUSNESS MENTAL STATUS
[facility] Emergency Department    [place]  [address] [address]    Phone:  [phone]    Fax:  [phone]   MRN: [id number]    Department:  [facility] Emergency Department   Date of Visit:  3/13/2 from our patient liason soon after your visit. Also, some patients receive a detailed feedback survey mailed to them a week after the visit. If you receive this, we would really appreciate it if you could take the time to complete it. Thank you!       You Awake/Cooperative/Alert [facility] [address] [facility] [id number] Sharmila Quinn 139 ([address]) [facility] [address] [facility]. ([address]) [facility]  [facility]

## 2020-07-30 NOTE — ED ADULT NURSE REASSESSMENT NOTE - COMFORT CARE
darkened lights/po fluids offered/repositioned/plan of care explained/ambulated to bathroom/warm blanket provided

## 2020-07-30 NOTE — ED ADULT NURSE REASSESSMENT NOTE - NS ED NURSE REASSESS COMMENT FT1
Report received from LUZ MARIA Ruiz. Pt resting comfortably in bed at this time. Denies chest pain at this time. VS as documented. Bed locked and lowered. Comfort and safety measures maintained. Report received from LUZ MARIA Ruiz. Pt resting comfortably in bed at this time. Denies chest pain at this time. VS as documented. Oriented on call bell system. Bed locked and lowered. Comfort and safety measures maintained.

## 2020-07-30 NOTE — ED PROVIDER NOTE - PSH
S/P dilation and curettage  2008  Status post hysteroscopy  For polyp removal and D&C 2/2 endometriosis

## 2020-07-30 NOTE — ED PROVIDER NOTE - PROGRESS NOTE DETAILS
SAIRA Bryant: spoke to GYN attending Dr. De who stated patient had LMA during the procedure and was sedated with propofol. Patient became bradycardic and had bigeminy during the procedure which resolved with glycopyrrolate. Procedure was from 3:15-4:30pm. Patient then woke up stating intense left sided chest pressure that resolved after a few minutes. GYN attending  Dr. De called transfer center and spoke to cardiology Dr. Holman and ED attending Dr. Nino SAIRA Bryant: cardiology Dr. Holman at bedside awaiting recs SAIRA Bryant: spoke to GYN attending Dr. De [phone] who stated patient had LMA during the procedure and was sedated with propofol. Patient became bradycardic and had bigeminy during the procedure which resolved with glycopyrrolate. Procedure was from 3:15-4:30pm. Patient then woke up stating intense left sided chest pressure that resolved after a few minutes. GYN attending  Dr. De called transfer center and spoke to cardiology Dr. Holman and ED attending Dr. Nino SAIRA Bryant: cardiology recommended CDU for tele

## 2020-07-30 NOTE — PROGRESS NOTE ADULT - SUBJECTIVE AND OBJECTIVE BOX
56 yo PMH DMII, HTN, HLD, intermittent bradycardia s/p hysteroscopy, polypectomy, D&C with c/o left chest pressure. No SOB, N/V, abd pain, or vaginal bleeding. No radiation of pain. Of note, during intubation, patient had bradycardia and bigeminy which was managed by anesthesia.     Vitals: 100 bpm, 100% O2 on RA, 146/76, 13 br/min  Gen: NAD  CV: Left chest dull tenderness, no point tenderness  Abd: soft, NT, ND  Pelvis: No bleeding  Ext: NT b/l with no edema    EKG done and reviewed by anesthesia: nonspecific ST changes  Labwork drawn and pending

## 2020-07-30 NOTE — ASU PATIENT PROFILE, ADULT - PMH
Pt alert and oriented.  Good insight into her health problem and plan for the future.  On namenda and aricept at home which are continued here.       DM (diabetes mellitus)  type2  Hyperlipidemia    Hypertension

## 2020-07-30 NOTE — CONSULT NOTE ADULT - CONSULT REASON
Sent from [facility] ambulatory s/p DNC and hysteroscopy for active chest pain, PVCs and bradycardia to HR 40s after procedure

## 2020-07-30 NOTE — PROGRESS NOTE ADULT - ASSESSMENT
56 yo PMH DMII, HTN, HLD, intermittent bradycardia s/p hysteroscopy, polypectomy, D&C with c/o left chest pressure.     - Transfer to ED for workup of CP  - Cardiology notified  -  informed  - Cleared from GYN standpoint for any procedures or necessary studies    Will continue to follow up with patient.

## 2020-07-30 NOTE — CONSULT NOTE ADULT - ASSESSMENT
54 yo F HTN, DM, HLD brought to the ED from ambulatory surgery clinic after having chest pain, PVCs, and bradycardia immediately post procedure. Now chest pain free but has occasional asymptomatic PVCs on tele.  -monitor in CDU overnight on tele for PVC burden post procedure. Patient remains asymptomatic  -f/u cardiac markers trop and CK  -f/u lytes and Hb  -if patient develops chest pain, please repeat EKG and call cardiology

## 2020-07-30 NOTE — ED CDU PROVIDER DISPOSITION NOTE - ATTENDING CONTRIBUTION TO CARE
Attending Statement (JUAN CARLOS Salgado MD):    HPI: 56y/o F with h/o HTN HLD DM presenting to ED yesterday c/o chest pain which began following procedure yesterday; patient underwent hysteroscopy and fundal uterine polyp removal and D&C with anesthesia; states upon awakening, she began having left sided chest pain and was noted (per report) to have bradycardia (40-50) with bigeminy; unclear how long this lasted but in NSR upon arrival to ED from procedure; pain resolved in ED.    While in ED, ECG shows NSR, no signs of acute ischemia/infarct; labs obtained, no actionable abnormalities; initial trop <6. CXR unremarkable.  Sent to CDU for cardiac monitoring, cardiology consultation.    In CDU, initially ordered for TTE but per cardiology consultation recommendations cancelled as not needed.  Overnight no events, but noted to have brief (<minute; per report from [place]) episode of bradycardia while sleeping with HR 40-50bpm.  At present NSR on monitor and patient has no complaints; remains asymptomatic.  Repeat troponin level <6.       Review of Systems:  -General: no fever or chills  -ENT: no congestion, no difficulty swallowing  -Pulmonary: no cough, no shortness of breath  -Cardiac: + chest pain, no palpitations  -Gastrointestinal: no abdominal pain, no nausea, no vomiting, and no diarrhea.  -Genitourinary: no blood or pain with urination  -Musculoskeletal: no back or neck pain  -Skin: no rashes  -Endocrine: No h/o diabetes or thyroid disease  -Neurologic: No focal weakness or numbness      PSH/PMH as noted above    On Physical Exam:  General: well appearing, in NAD, speaking clearly in full sentences and without difficulty; cooperative with exam  HEENT: PERRL, MMM  Neck: no neck tenderness, no nuchal rigidity  Cardiac: normal s1, s2; RRR; no MGR  Lungs: CTABL  Abdomen: soft nontender/nondistended  : no bladder tenderness or distension  Skin: intact, no rash  Extremities: no peripheral edema, no gross deformities  Neuro: no gross neurologic deficits    MDM: CP; Patient's description of chest pain, including lack of pleuritic nature, minimal risk factors and overall clinical picture are not consistent with a pulmonary embolism. The patient's clinical presentation, including type/description of pain, stable vitals and overall clinical picture are not consistent with an acute aortic dissection.  Unlikely to be ACS given presentation, negative troponin x 2 and per cardiology consultation.  Will plan now for discharge with close interval f/u; possible anesthetic reaction.  No evidence of acute infectious etiology.

## 2020-07-30 NOTE — ED CDU PROVIDER DISPOSITION NOTE - CARE PROVIDER_API CALL
Arik Elder J  CARDIOVASCULAR DISEASE  [address] [place] [address] [place] [address]  Phone: [phone]  Fax: [phone]  Follow Up Time: 1-3 Days

## 2020-07-30 NOTE — ASU DISCHARGE PLAN (ADULT/PEDIATRIC) - NURSING INSTRUCTIONS
Next dose of Tylenol will be on or after ___________ ,today/tonight and every 6 hours afterwards as needed for pain management, do not take any Tylenol containing products until this time. Your first dose of Tylenol was given at ___________. Do not exceed more than 4000mg of Tylenol in one 24 hour setting. If no contraindications, you may alternate with Ibuprofen  3 hours after dose of Tylenol. Ibuprofen can be taken every 6 hours. Next dose of Tylenol will be on or after _9:36PM__________ ,today/tonight and every 6 hours afterwards as needed for pain management, do not take any Tylenol containing products until this time. Your first dose of Tylenol was given at ____3:36 PM_______. Do not exceed more than 4000mg of Tylenol in one 24 hour setting. If no contraindications, you may alternate with Ibuprofen  3 hours after dose of Tylenol. Ibuprofen can be taken every 6 hours.

## 2020-07-30 NOTE — ED CDU PROVIDER INITIAL DAY NOTE - OBJECTIVE STATEMENT
Patient is a 56yo woman with PMH of HTN, HLD, DM coming in s/p hysteroscopy due to chest pressure. Patient had hysteroscopy today for fundus polyp removal and D&C. During the procedure patient had bradycardia and bigeminy. After waking up, patient started complaining of pressure under the left breast. Did not radiate anywhere. Was cleared for hysteroscopy by PCP. States she takes metformin, losartan, HCTZ, and atorvastatin; has recently started taking potassium supplements. Pre-anesthesia lab work shows electrolytes within normal limits. Currently patient is not in pain or distress.   Denies headaches, LOC, recent weight changes, SOB, fever.

## 2020-07-30 NOTE — PACU DISCHARGE NOTE - COMMENTS
Dr. Sinclair informed me that the patient had an episode of bradycardia with frequent PVC's following induction.  It resolved with glycopyrrolate.  The patient's vital signs remained stable the duration of the surgery.  In PACU a few PVC's were noted initially and the patient complained of nonspecific chest pressure on her left side.  An ECG was done which demonstrated nonspecific ST changes and prolonged QT.    Dr. De was at the bedside and the GYN resident called a consult with cardiology.  They requested to see the patient in the main hospital so she was transferred to the ED.

## 2020-07-30 NOTE — ASU DISCHARGE PLAN (ADULT/PEDIATRIC) - CALL YOUR DOCTOR IF YOU HAVE ANY OF THE FOLLOWING:
Bleeding that does not stop/Unable to urinate/Fever greater than (need to indicate Fahrenheit or Celsius)/Nausea and vomiting that does not stop/Inability to tolerate liquids or foods Bleeding that does not stop/Nausea and vomiting that does not stop/Pain not relieved by Medications/Fever greater than (need to indicate Fahrenheit or Celsius)/Unable to urinate/Inability to tolerate liquids or foods

## 2020-07-30 NOTE — ED PROVIDER NOTE - OBJECTIVE STATEMENT
Patient is a 54yo woman with PMH of HTN, HLD, DM coming in s/p hysteroscopy due to chest pressure. Patient had hysteroscopy today for fundus polyp removal and D&C. During the procedure patient had bradycardia and bigeminy. After waking up, patient started complaining of pressure under the left breast. Did not radiate anywhere. Was cleared for hysteroscopy by PCP. States she takes metformin, losartan, HCTZ, and atorvastatin; has recently started taking potassium supplements. Pre-anesthesia lab work shows electrolytes within normal limits. Currently patient is not in pain or distress.   Denies headaches, LOC, recent weight changes, SOB, fever.

## 2020-07-30 NOTE — BRIEF OPERATIVE NOTE - NSICDXBRIEFPROCEDURE_GEN_ALL_CORE_FT
PROCEDURES:  Hysteroscopy, with dilation and curettage of uterus, with polypectomy or myomectomy 30-Jul-2020 16:12:32  Eerndira De

## 2020-07-30 NOTE — ED PROVIDER NOTE - FAMILY HISTORY
Father  Still living? Unknown  FH: heart disease, Age at diagnosis: Age Unknown     Mother  Still living? Unknown  FH: heart disease, Age at diagnosis: Age Unknown

## 2020-07-30 NOTE — ED CDU PROVIDER INITIAL DAY NOTE - PROGRESS NOTE DETAILS
spoke with cards, not recommending echo at this time, is ok with pt taking home meds of losartan and hctz. -LORENZO DhaliwalC

## 2020-07-31 VITALS
DIASTOLIC BLOOD PRESSURE: 67 MMHG | HEART RATE: 68 BPM | SYSTOLIC BLOOD PRESSURE: 102 MMHG | RESPIRATION RATE: 18 BRPM | OXYGEN SATURATION: 98 % | TEMPERATURE: 98 F

## 2020-07-31 LAB
ANION GAP SERPL CALC-SCNC: 17 MMOL/L — SIGNIFICANT CHANGE UP (ref 5–17)
BUN SERPL-MCNC: 15 MG/DL — SIGNIFICANT CHANGE UP (ref 7–23)
CALCIUM SERPL-MCNC: 8.8 MG/DL — SIGNIFICANT CHANGE UP (ref 8.4–10.5)
CHLORIDE SERPL-SCNC: 103 MMOL/L — SIGNIFICANT CHANGE UP (ref 96–108)
CO2 SERPL-SCNC: 20 MMOL/L — LOW (ref 22–31)
CREAT SERPL-MCNC: 0.76 MG/DL — SIGNIFICANT CHANGE UP (ref 0.5–1.3)
GAS PNL BLDV: SIGNIFICANT CHANGE UP
GLUCOSE BLDC GLUCOMTR-MCNC: 132 MG/DL — HIGH (ref 70–99)
GLUCOSE SERPL-MCNC: 190 MG/DL — HIGH (ref 70–99)
POTASSIUM SERPL-MCNC: 3.6 MMOL/L — SIGNIFICANT CHANGE UP (ref 3.5–5.3)
POTASSIUM SERPL-SCNC: 3.6 MMOL/L — SIGNIFICANT CHANGE UP (ref 3.5–5.3)
SODIUM SERPL-SCNC: 140 MMOL/L — SIGNIFICANT CHANGE UP (ref 135–145)

## 2020-07-31 PROCEDURE — 85027 COMPLETE CBC AUTOMATED: CPT

## 2020-07-31 PROCEDURE — 80048 BASIC METABOLIC PNL TOTAL CA: CPT

## 2020-07-31 PROCEDURE — 83605 ASSAY OF LACTIC ACID: CPT

## 2020-07-31 PROCEDURE — 87635 SARS-COV-2 COVID-19 AMP PRB: CPT

## 2020-07-31 PROCEDURE — 85014 HEMATOCRIT: CPT

## 2020-07-31 PROCEDURE — 84484 ASSAY OF TROPONIN QUANT: CPT

## 2020-07-31 PROCEDURE — 84100 ASSAY OF PHOSPHORUS: CPT

## 2020-07-31 PROCEDURE — 82947 ASSAY GLUCOSE BLOOD QUANT: CPT

## 2020-07-31 PROCEDURE — 81001 URINALYSIS AUTO W/SCOPE: CPT

## 2020-07-31 PROCEDURE — 80053 COMPREHEN METABOLIC PANEL: CPT

## 2020-07-31 PROCEDURE — 82962 GLUCOSE BLOOD TEST: CPT

## 2020-07-31 PROCEDURE — 82330 ASSAY OF CALCIUM: CPT

## 2020-07-31 PROCEDURE — 82435 ASSAY OF BLOOD CHLORIDE: CPT

## 2020-07-31 PROCEDURE — 84132 ASSAY OF SERUM POTASSIUM: CPT

## 2020-07-31 PROCEDURE — 99284 EMERGENCY DEPT VISIT MOD MDM: CPT | Mod: 25

## 2020-07-31 PROCEDURE — 99284 EMERGENCY DEPT VISIT MOD MDM: CPT

## 2020-07-31 PROCEDURE — 85610 PROTHROMBIN TIME: CPT

## 2020-07-31 PROCEDURE — 93005 ELECTROCARDIOGRAM TRACING: CPT

## 2020-07-31 PROCEDURE — 99217: CPT

## 2020-07-31 PROCEDURE — 82803 BLOOD GASES ANY COMBINATION: CPT

## 2020-07-31 PROCEDURE — 71045 X-RAY EXAM CHEST 1 VIEW: CPT

## 2020-07-31 PROCEDURE — G0378: CPT

## 2020-07-31 PROCEDURE — 84295 ASSAY OF SERUM SODIUM: CPT

## 2020-07-31 PROCEDURE — 83735 ASSAY OF MAGNESIUM: CPT

## 2020-07-31 PROCEDURE — 36415 COLL VENOUS BLD VENIPUNCTURE: CPT

## 2020-07-31 PROCEDURE — 85730 THROMBOPLASTIN TIME PARTIAL: CPT

## 2020-07-31 RX ADMIN — Medication 12.5 MILLIGRAM(S): at 09:46

## 2020-07-31 RX ADMIN — METFORMIN HYDROCHLORIDE 500 MILLIGRAM(S): 850 TABLET ORAL at 08:15

## 2020-07-31 RX ADMIN — SODIUM CHLORIDE 75 MILLILITER(S): 9 INJECTION INTRAMUSCULAR; INTRAVENOUS; SUBCUTANEOUS at 08:15

## 2020-07-31 NOTE — ED CDU PROVIDER SUBSEQUENT DAY NOTE - HISTORY
Pt resting comfortably. NAD. No complaints. VSS. pt monique to 40s/50s briefly, sleeping, asymptomatic. no chest pain, shortness of breath or difficulty breathing, will continue to monitor. -Melissa Rutledge PA-C

## 2020-07-31 NOTE — ED CDU PROVIDER SUBSEQUENT DAY NOTE - ATTENDING CONTRIBUTION TO CARE
Attending Statement (JUAN CARLOS Salgado MD):    HPI: 54y/o F with h/o HTN HLD DM presenting to ED yesterday c/o chest pain which began following procedure yesterday; patient underwent hysteroscopy and fundal uterine polyp removal and D&C with anesthesia; states upon awakening, she began having left sided chest pain and was noted (per report) to have bradycardia (40-50) with bigeminy; unclear how long this lasted but in NSR upon arrival to ED from procedure; pain resolved in ED.    While in ED, ECG shows NSR, no signs of acute ischemia/infarct; labs obtained, no actionable abnormalities; initial trop <6. CXR unremarkable.  Sent to CDU for cardiac monitoring, cardiology consultation.    In CDU, initially ordered for TTE but per cardiology consultation recommendations cancelled as not needed.  Overnight no events, but noted to have brief (<minute; per report from PA) episode of bradycardia while sleeping with HR 40-50bpm.  At present NSR on monitor and patient has no complaints; remains asymptomatic.  Repeat troponin level <6.       Review of Systems:  -General: no fever or chills  -ENT: no congestion, no difficulty swallowing  -Pulmonary: no cough, no shortness of breath  -Cardiac: + chest pain, no palpitations  -Gastrointestinal: no abdominal pain, no nausea, no vomiting, and no diarrhea.  -Genitourinary: no blood or pain with urination  -Musculoskeletal: no back or neck pain  -Skin: no rashes  -Endocrine: No h/o diabetes or thyroid disease  -Neurologic: No focal weakness or numbness      PSH/PMH as noted above    On Physical Exam:  General: well appearing, in NAD, speaking clearly in full sentences and without difficulty; cooperative with exam  HEENT: PERRL, MMM  Neck: no neck tenderness, no nuchal rigidity  Cardiac: normal s1, s2; RRR; no MGR  Lungs: CTABL  Abdomen: soft nontender/nondistended  : no bladder tenderness or distension  Skin: intact, no rash  Extremities: no peripheral edema, no gross deformities  Neuro: no gross neurologic deficits    MDM: CP; Patient's description of chest pain, including lack of pleuritic nature, minimal risk factors and overall clinical picture are not consistent with a pulmonary embolism. The patient's clinical presentation, including type/description of pain, stable vitals and overall clinical picture are not consistent with an acute aortic dissection.  Unlikely to be ACS given presentation, negative troponin x 2 and per cardiology consultation.  Will plan now for discharge with close interval f/u; possible anesthetic reaction.  No evidence of acute infectious etiology.

## 2020-07-31 NOTE — ED CDU PROVIDER SUBSEQUENT DAY NOTE - PROGRESS NOTE DETAILS
Pt resting comfortably. NAD. No complaints. VSS. no chest pain, shortness of breath, difficulty breathing, no events on tele, HR in 50s-60s. will continue to monitor. Pt comfortable. No complaints. Vital signs stable. Will continue to observe and reassess.  awaiting cardiology re-evaluation. -Alexa Trujillo PA-C Pt comfortable. No complaints. Vital signs stable. seen by cardiology Dr. Blevins and cleared for discharge home with outpt follow up with him. discussed with Dr. Salgado, pt stable for d/c home. -Alexa Trujillo PA-C

## 2020-07-31 NOTE — ED ADULT NURSE REASSESSMENT NOTE - NS ED NURSE REASSESS COMMENT FT1
11.15 Am Pt was evaluated by  CDU MD Quintin Salgado with all the results. pt is feeling better Pt is discharged ML out SAIRA Trujillo explained the follow up care & gave the discharge summary  Pt verbalized the understanding  pt is stable to go home  Pt is A&OX 4 Steady gait  has stable vitals at the time of discharge

## 2020-07-31 NOTE — ED ADULT NURSE REASSESSMENT NOTE - NS ED NURSE REASSESS COMMENT FT1
07.00 Am Received report from Rn Beth Campoverde . Pt is observed for CP with PVC for telemonitoring  . Pt is A&OX 4 obeys  commands GCS ambulates with steady gait . Pt denies N/V/D fever chills CP SOB  dizziness .   IV site infiltrated  ML out awaiting for CDU decision for possible discharge. Comfort care& safety measures  maintained Call bell with in the reach  pt is advised to call for help if needed Pt verbalized the understanding .  Pt is NSR on monitor continue to monitor no PVC noted at this time

## 2020-07-31 NOTE — PROGRESS NOTE ADULT - SUBJECTIVE AND OBJECTIVE BOX
HPI:  54 yo F with HTN, DM, HLD brought to the ED from ambulatory surgery clinic after having chest pain, PVCs, and bradycardia immediately post procedure.  Denies any active chest pain at this time- states it resolved by the time she arrived to the ED. Denies any hx of CAD or cardiac procedures.    ===========================  Interval Events  -   - occasional PVCs  - No reported worsening CP/Palps/SOB\  ===========================      PMHx:   Hyperlipidemia  DM (diabetes mellitus)  Hypertension      PSHx:   Status post hysteroscopy  S/P dilation and curettage      Allergies:  No Known Allergies      Home Meds:  Losartan  Metformin  Atorvastatin    Current Medications:   sodium chloride 0.9%. 1000 milliLiter(s) IV Continuous <Continuous>      FAMILY HISTORY:  FH: heart disease (Father, Mother): mother, father            Physical Exam:  T(F): 98.4 (07-30), Max: 98.6 (07-30)  HR: 103 (07-30) (76 - 110)  BP: 143/74 (07-30) (106/72 - 158/78)  RR: 15 (07-30)  SpO2: 97% (07-30)  GENERAL: No acute distress, well-developed  CHEST/LUNG: Clear to auscultation bilaterally; No wheeze, equal breath sounds bilaterally   HEART: Regular rate and rhythm; No murmurs, rubs, or gallops  Ext: no edema bilaterally  Abd: Bening soft  Ext: no edema  Skin: No lesions  Psychiatric: normal affect  Neuro: Cranial nerves grosslyintact     LINES:    Cardiovascular Diagnostic Testing:    ECG: Personally reviewed: sinus tachycardia (HR: 107)). No ST/T wave changes. Occasional PVCs noted on tele      Labs Personally Reviewed 07/31/2020                              15.3   10.27 )-----------( 222      ( 30 Jul 2020 18:21 )             45.4     07-30    141  |  101  |  16  ----------------------------<  133<H>  4.1   |  25  |  0.77    Ca    9.8      30 Jul 2020 12:20      PT/INR - ( 30 Jul 2020 18:21 )   PT: 12.1 sec;   INR: 1.02 ratio         PTT - ( 30 Jul 2020 18:21 )  PTT:35.3 sec            Assessment and Recommendation:   · Assessment		  54 yo F HTN, DM, HLD brought to the ED from ambulatory surgery clinic after having chest pain, PVCs, and bradycardia immediately post procedure. Now chest pain free but has occasional asymptomatic PVCs on tele.  -likely vagal  - ok to d/c   - f/u stress; TTE as outpatient.

## 2020-08-11 ENCOUNTER — TRANSCRIPTION ENCOUNTER (OUTPATIENT)
Age: 55
End: 2020-08-11

## 2021-01-01 NOTE — ASU DISCHARGE PLAN (ADULT/PEDIATRIC) - PROVIDER TOKENS
(2) more than 100 beats/min
PROVIDER:[TOKEN:[[id number]],FOLLOWUP:[2 weeks],ESTABLISHEDPATIENT:[T]]

## 2021-01-05 ENCOUNTER — RESULT REVIEW (OUTPATIENT)
Age: 56
End: 2021-01-05

## 2021-08-25 NOTE — ED CDU PROVIDER DISPOSITION NOTE - NS ED ATTENDING STATEMENT MOD
Rx Refill Note  Requested Prescriptions     Pending Prescriptions Disp Refills   • atorvastatin (LIPITOR) 80 MG tablet [Pharmacy Med Name: ATORVASTATIN 80 MG TABLET] 90 tablet 1     Sig: TAKE ONE TABLET BY MOUTH ONCE NIGHTLY   • omeprazole (priLOSEC) 40 MG capsule [Pharmacy Med Name: OMEPRAZOLE DR 40 MG CAPSULE] 90 capsule 0     Sig: TAKE ONE CAPSULE BY MOUTH DAILY   • amitriptyline (ELAVIL) 10 MG tablet [Pharmacy Med Name: AMITRIPTYLINE HCL 10 MG TAB] 180 tablet 1     Sig: TAKE ONE TABLET BY MOUTH TWICE A DAY      Last office visit with prescribing clinician: 2/25/2021      Next office visit with prescribing clinician: 9/27/2021            Ida Barrios MA  08/25/21, 16:19 EDT   I have personally performed a face to face diagnostic evaluation on this patient. I have reviewed the ACP note and agree with the history, exam and plan of care, except as noted.

## 2023-04-26 NOTE — ED CDU PROVIDER INITIAL DAY NOTE - ATTENDING CONTRIBUTION TO CARE
Female Completion Statement: After discussing her treatment course we decided to discontinue isotretinoin therapy at this time. I explained that she would need to continue her birth control methods for at least one month after the last dosage. She should also get a pregnancy test one month after the last dose. She shouldn't donate blood for one month after the last dose. She should call with any new symptoms of depression. RGUJRAL 56 yo female hx listed BIB EMS from ambulatory surgery s/p hysteroscopy and D&C for polyp evaluation. patient was intubated with LMA and states upon waking up she had L sided chest pressure. Per GYN, pt had episode of bradycardia and bigeminy. Patient states episode lasted few minutes and now improving. Denies any complaints at this time.   Procedure was about 1 hour.   On exam, Patient is awake, alert and oriented x 3.  Patient is well appearing and in no acute distress.  NCAT, PERRL, EOMI.  Neck is supple, No LAD.  Lungs are CTA B/L,+S1S2 no murmurs,  Abdomen:Soft nd/nt+bs no rebound or guarding.  Extremity no edema or calf tender.  Skin with no rash.  Neuro CN3-12 intact. Strength 5/5 in upper and lower extremities. Nml Sensation.  EKG reviewed. Labs reviewed, cardiology consult appreciated.   Admit patient to CDU for telemetry monitoring and possible ECHO.

## 2024-06-07 ENCOUNTER — NON-APPOINTMENT (OUTPATIENT)
Age: 59
End: 2024-06-07

## 2025-03-02 ENCOUNTER — NON-APPOINTMENT (OUTPATIENT)
Age: 60
End: 2025-03-02